# Patient Record
Sex: FEMALE
[De-identification: names, ages, dates, MRNs, and addresses within clinical notes are randomized per-mention and may not be internally consistent; named-entity substitution may affect disease eponyms.]

---

## 2020-08-27 ENCOUNTER — HOSPITAL ENCOUNTER (INPATIENT)
Dept: HOSPITAL 90 - LDH | Age: 25
LOS: 2 days | Discharge: HOME | DRG: 540 | End: 2020-08-29
Attending: OBSTETRICS & GYNECOLOGY | Admitting: OBSTETRICS & GYNECOLOGY
Payer: MEDICAID

## 2020-08-27 VITALS — WEIGHT: 183 LBS | HEIGHT: 64 IN | BODY MASS INDEX: 31.24 KG/M2

## 2020-08-27 DIAGNOSIS — E66.9: ICD-10-CM

## 2020-08-27 DIAGNOSIS — O34.211: Primary | ICD-10-CM

## 2020-08-27 DIAGNOSIS — J45.909: ICD-10-CM

## 2020-08-27 DIAGNOSIS — Z23: ICD-10-CM

## 2020-08-27 DIAGNOSIS — Z3A.39: ICD-10-CM

## 2020-08-27 LAB
ERYTHROCYTE [DISTWIDTH] IN BLOOD BY AUTOMATED COUNT: 13.9 % (ref 11–15.5)
HCT VFR BLD AUTO: 36.8 % (ref 36–48)
MCH RBC QN AUTO: 28.7 PG (ref 27–33)
MCHC RBC AUTO-ENTMCNC: 32.9 G/DL (ref 32–36)
MCV RBC AUTO: 87.4 FL (ref 79–99)
NRBC BLD MANUAL-RTO: 0 % (ref 0–0.19)
PLATELET # BLD AUTO: 224 K/UL (ref 130–400)
RBC # BLD AUTO: 4.21 MIL/UL (ref 4–5.5)
WBC # BLD AUTO: 10.7 K/UL (ref 4.8–10.8)

## 2020-08-27 PROCEDURE — 86900 BLOOD TYPING SEROLOGIC ABO: CPT

## 2020-08-27 PROCEDURE — 86850 RBC ANTIBODY SCREEN: CPT

## 2020-08-27 PROCEDURE — 59510 CESAREAN DELIVERY: CPT

## 2020-08-27 PROCEDURE — 90715 TDAP VACCINE 7 YRS/> IM: CPT

## 2020-08-27 PROCEDURE — 36415 COLL VENOUS BLD VENIPUNCTURE: CPT

## 2020-08-27 PROCEDURE — 87340 HEPATITIS B SURFACE AG IA: CPT

## 2020-08-27 PROCEDURE — 85027 COMPLETE CBC AUTOMATED: CPT

## 2020-08-27 PROCEDURE — 86592 SYPHILIS TEST NON-TREP QUAL: CPT

## 2020-08-27 PROCEDURE — 82948 REAGENT STRIP/BLOOD GLUCOSE: CPT

## 2020-08-27 PROCEDURE — 86901 BLOOD TYPING SEROLOGIC RH(D): CPT

## 2020-08-28 VITALS — DIASTOLIC BLOOD PRESSURE: 72 MMHG | SYSTOLIC BLOOD PRESSURE: 107 MMHG

## 2020-08-28 VITALS — SYSTOLIC BLOOD PRESSURE: 143 MMHG | DIASTOLIC BLOOD PRESSURE: 84 MMHG

## 2020-08-28 VITALS — DIASTOLIC BLOOD PRESSURE: 87 MMHG | SYSTOLIC BLOOD PRESSURE: 129 MMHG

## 2020-08-28 VITALS — DIASTOLIC BLOOD PRESSURE: 71 MMHG | SYSTOLIC BLOOD PRESSURE: 149 MMHG

## 2020-08-28 VITALS — DIASTOLIC BLOOD PRESSURE: 80 MMHG | SYSTOLIC BLOOD PRESSURE: 133 MMHG

## 2020-08-28 VITALS — DIASTOLIC BLOOD PRESSURE: 76 MMHG | SYSTOLIC BLOOD PRESSURE: 135 MMHG

## 2020-08-28 VITALS — DIASTOLIC BLOOD PRESSURE: 80 MMHG | SYSTOLIC BLOOD PRESSURE: 130 MMHG

## 2020-08-28 VITALS — SYSTOLIC BLOOD PRESSURE: 131 MMHG | DIASTOLIC BLOOD PRESSURE: 83 MMHG

## 2020-08-28 VITALS — SYSTOLIC BLOOD PRESSURE: 126 MMHG | DIASTOLIC BLOOD PRESSURE: 90 MMHG

## 2020-08-28 VITALS — SYSTOLIC BLOOD PRESSURE: 131 MMHG | DIASTOLIC BLOOD PRESSURE: 98 MMHG

## 2020-08-28 VITALS — DIASTOLIC BLOOD PRESSURE: 98 MMHG | SYSTOLIC BLOOD PRESSURE: 137 MMHG

## 2020-08-28 VITALS — DIASTOLIC BLOOD PRESSURE: 82 MMHG | SYSTOLIC BLOOD PRESSURE: 123 MMHG

## 2020-08-28 VITALS — SYSTOLIC BLOOD PRESSURE: 131 MMHG | DIASTOLIC BLOOD PRESSURE: 81 MMHG

## 2020-08-28 VITALS — SYSTOLIC BLOOD PRESSURE: 135 MMHG | DIASTOLIC BLOOD PRESSURE: 84 MMHG

## 2020-08-28 VITALS — SYSTOLIC BLOOD PRESSURE: 128 MMHG | DIASTOLIC BLOOD PRESSURE: 74 MMHG

## 2020-08-28 VITALS — SYSTOLIC BLOOD PRESSURE: 143 MMHG | DIASTOLIC BLOOD PRESSURE: 75 MMHG

## 2020-08-28 PROCEDURE — 3E0134Z INTRODUCTION OF SERUM, TOXOID AND VACCINE INTO SUBCUTANEOUS TISSUE, PERCUTANEOUS APPROACH: ICD-10-PCS | Performed by: OBSTETRICS & GYNECOLOGY

## 2020-08-28 PROCEDURE — 3E0234Z INTRODUCTION OF SERUM, TOXOID AND VACCINE INTO MUSCLE, PERCUTANEOUS APPROACH: ICD-10-PCS | Performed by: OBSTETRICS & GYNECOLOGY

## 2020-08-28 RX ADMIN — IBUPROFEN SCH MLS/HR: 800 INJECTION INTRAVENOUS at 17:36

## 2020-08-28 RX ADMIN — HYDROCODONE BITARTRATE AND ACETAMINOPHEN PRN TAB: 5; 325 TABLET ORAL at 11:20

## 2020-08-28 RX ADMIN — DEXTROSE AND SODIUM CHLORIDE PRN MLS/HR: 5; .45 INJECTION, SOLUTION INTRAVENOUS at 15:47

## 2020-08-28 RX ADMIN — IBUPROFEN SCH MG: 800 TABLET ORAL at 09:30

## 2020-08-28 RX ADMIN — DOCUSATE SODIUM SCH MG: 100 TABLET, FILM COATED ORAL at 20:34

## 2020-08-28 RX ADMIN — HYDROCODONE BITARTRATE AND ACETAMINOPHEN PRN TAB: 5; 325 TABLET ORAL at 20:35

## 2020-08-28 NOTE — NUR
DR. BETANCOURT NOTIFIED ON PATIENT'S STATUS. INFORMED THAT PATIENT HAS SMALL BLEEDING ON 
MASSAGE AND CONTINUOUS SMALL TRICKLE. NEW ORDERS RECEIVED FOR METHERGINE PO Q6 X4 DOSES.

## 2020-08-28 NOTE — NUR
PT. EXPRESSED CONCERN ABOUT A SMALL OOZING SPOT ON HER RIGHT CLAVICLE (COVERED WITH GAUZE 
AND BAND AID). PT. REMOVED THE BAND AID AND IT SHOWED THE SMALL OOZING SPOT.  SHE CLAIMED 
SHE'S HAD IT LIKE THAT SINCE LAST WEEK AFTER SHE SCRATCHED OFF WHAT LOOKED LIKE A TINT 
PIMPLE FROM HER RIGHT CLAVICLE. A FRESH SMALL GAUZE APPLIED WITH A CLEAN BAND AID. PT. INST 
THAT MD WILL BE MADE AWARE OF IT DURING THE DAYTIME SINCE ONLY A TINY OOZING WAS NOTED.

## 2020-08-28 NOTE — NUR
FUNDUS MASSAGED, BLEEDING IS SMALL. SMALL TRICKLE NOTED. PARTH HARO RN AT BEDSIDE. 
PERICARE GIVEN AT THIS TIME.

## 2020-08-29 VITALS — DIASTOLIC BLOOD PRESSURE: 63 MMHG | SYSTOLIC BLOOD PRESSURE: 97 MMHG

## 2020-08-29 VITALS — DIASTOLIC BLOOD PRESSURE: 82 MMHG | SYSTOLIC BLOOD PRESSURE: 117 MMHG

## 2020-08-29 VITALS — DIASTOLIC BLOOD PRESSURE: 56 MMHG | SYSTOLIC BLOOD PRESSURE: 105 MMHG

## 2020-08-29 VITALS — DIASTOLIC BLOOD PRESSURE: 65 MMHG | SYSTOLIC BLOOD PRESSURE: 105 MMHG

## 2020-08-29 LAB
ERYTHROCYTE [DISTWIDTH] IN BLOOD BY AUTOMATED COUNT: 13.5 % (ref 11–15.5)
HCT VFR BLD AUTO: 28.9 % (ref 36–48)
MCH RBC QN AUTO: 29 PG (ref 27–33)
MCHC RBC AUTO-ENTMCNC: 33.2 G/DL (ref 32–36)
MCV RBC AUTO: 87.3 FL (ref 79–99)
NRBC BLD MANUAL-RTO: 0 % (ref 0–0.19)
PLATELET # BLD AUTO: 188 K/UL (ref 130–400)
RBC # BLD AUTO: 3.31 MIL/UL (ref 4–5.5)
WBC # BLD AUTO: 15.9 K/UL (ref 4.8–10.8)

## 2020-08-29 RX ADMIN — CLOSTRIDIUM TETANI TOXOID ANTIGEN (FORMALDEHYDE INACTIVATED), CORYNEBACTERIUM DIPHTHERIAE TOXOID ANTIGEN (FORMALDEHYDE INACTIVATED), BORDETELLA PERTUSSIS TOXOID ANTIGEN (GLUTARALDEHYDE INACTIVATED), BORDETELLA PERTUSSIS FILAMENTOUS HEMAGGLUTININ ANTIGEN (FORMALDEHYDE INACTIVATED), BORDETELLA PERTUSSIS PERTACTIN ANTIGEN, AND BORDETELLA PERTUSSIS FIMBRIAE 2/3 ANTIGEN SCH ML: 5; 2; 2.5; 5; 3; 5 INJECTION, SUSPENSION INTRAMUSCULAR at 04:22

## 2020-08-29 RX ADMIN — DEXTROSE AND SODIUM CHLORIDE PRN MLS/HR: 5; .45 INJECTION, SOLUTION INTRAVENOUS at 01:37

## 2020-08-29 RX ADMIN — DOCUSATE SODIUM SCH MG: 100 TABLET, FILM COATED ORAL at 08:07

## 2020-08-29 RX ADMIN — IBUPROFEN SCH MLS/HR: 800 INJECTION INTRAVENOUS at 01:33

## 2020-08-29 RX ADMIN — HYDROCODONE BITARTRATE AND ACETAMINOPHEN PRN TAB: 5; 325 TABLET ORAL at 10:22

## 2020-08-29 RX ADMIN — HYDROCODONE BITARTRATE AND ACETAMINOPHEN PRN TAB: 5; 325 TABLET ORAL at 02:40

## 2020-08-29 RX ADMIN — IBUPROFEN SCH MG: 800 TABLET ORAL at 09:01

## 2020-08-29 NOTE — NUR
HYGEINE: AMB TO BR, UNABLE TO VOID, INSTRUCTED ON DASHA CARE AND RETURNED DEMONSTRATION. 
STATES SHE IS STILL IN PAIN, REQUESTING PAIN MED.

## 2020-08-29 NOTE — NUR
DISCHARGE: DISCHARGE INSTRUCTIONS GIVEN TO PT ON SELF CARE POST R C/S, INCISION 
CARE,REVIEWED RX FOR MOTRIN, COLACE,TYLENOL WITH CODEINE, TO FOLLOW UP WITH ESTHER MATHUR ON 
09/17 AT 2PM OR SOONER IF NEEDED. UNDERSTANDING VERBALIZED AND COPIES OF ALL INSTRUCTIONS 
GIVEN TO PT.

## 2020-08-29 NOTE — NUR
ASSESSMENT: RECEIVED SITTING UP IN CHAIR, EXPLAINED POC AND UNDERSTANDING VERBALIZED, CALL 
BELL AT HER SIDE. REQUESTING PAIN MED.

## 2020-08-29 NOTE — NUR
GUAJARDO CATHETER DISCONTINUED, PT. INST TO CALL FOR ASSIST BEFORE GETTING OUT OF CHAIR, 
VERBALIZED UNDERSTANDING.

## 2022-04-25 NOTE — NUR
PATIENT CALLED AND STATED THAT PADS NEEDED TO BE CHANGED. PARTH HARO RN ASSISTED IN 
FUNDAL MASSAGE. FUNDUS REMAINS FIRM, BLEEDING IS SMALL. WALNUT SIZED CLOT NOTED ON PERIPAD. 
PERICARE GIVEN AT THIS TIME. details…